# Patient Record
Sex: MALE | Race: BLACK OR AFRICAN AMERICAN | Employment: UNEMPLOYED | URBAN - NONMETROPOLITAN AREA
[De-identification: names, ages, dates, MRNs, and addresses within clinical notes are randomized per-mention and may not be internally consistent; named-entity substitution may affect disease eponyms.]

---
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
## 2023-02-26 ENCOUNTER — APPOINTMENT (OUTPATIENT)
Age: 23
End: 2023-02-26
Payer: COMMERCIAL

## 2023-02-26 ENCOUNTER — HOSPITAL ENCOUNTER (EMERGENCY)
Age: 23
Discharge: HOME | End: 2023-02-26
Attending: EMERGENCY MEDICINE
Payer: COMMERCIAL

## 2023-02-26 VITALS
HEIGHT: 73 IN | BODY MASS INDEX: 29.42 KG/M2 | RESPIRATION RATE: 17 BRPM | DIASTOLIC BLOOD PRESSURE: 85 MMHG | TEMPERATURE: 98.5 F | HEART RATE: 79 BPM | SYSTOLIC BLOOD PRESSURE: 137 MMHG | OXYGEN SATURATION: 100 % | WEIGHT: 222 LBS

## 2023-02-26 DIAGNOSIS — S70.02XA CONTUSION OF LEFT HIP, INITIAL ENCOUNTER: Primary | ICD-10-CM

## 2023-02-26 DIAGNOSIS — D17.79 LIPOMA OF OTHER SPECIFIED SITES: ICD-10-CM

## 2023-02-26 DIAGNOSIS — S70.02XA HIP HEMATOMA, LEFT, INITIAL ENCOUNTER: ICD-10-CM

## 2023-02-26 PROCEDURE — 6370000000 HC RX 637 (ALT 250 FOR IP): Performed by: EMERGENCY MEDICINE

## 2023-02-26 PROCEDURE — 99283 EMERGENCY DEPT VISIT LOW MDM: CPT

## 2023-02-26 PROCEDURE — 73502 X-RAY EXAM HIP UNI 2-3 VIEWS: CPT

## 2023-02-26 RX ORDER — IBUPROFEN 600 MG/1
600 TABLET ORAL 4 TIMES DAILY PRN
Qty: 20 TABLET | Refills: 0 | Status: CANCELLED | OUTPATIENT
Start: 2023-02-26 | End: 2023-03-03

## 2023-02-26 RX ORDER — IBUPROFEN 400 MG/1
800 TABLET ORAL ONCE
Status: COMPLETED | OUTPATIENT
Start: 2023-02-26 | End: 2023-02-26

## 2023-02-26 RX ADMIN — IBUPROFEN 800 MG: 400 TABLET, FILM COATED ORAL at 15:56

## 2023-02-26 ASSESSMENT — PAIN SCALES - GENERAL
PAINLEVEL_OUTOF10: 4
PAINLEVEL_OUTOF10: 4

## 2023-02-26 ASSESSMENT — ENCOUNTER SYMPTOMS
GASTROINTESTINAL NEGATIVE: 1
RESPIRATORY NEGATIVE: 1

## 2023-02-26 ASSESSMENT — PAIN - FUNCTIONAL ASSESSMENT
PAIN_FUNCTIONAL_ASSESSMENT: ACTIVITIES ARE NOT PREVENTED
PAIN_FUNCTIONAL_ASSESSMENT: 0-10

## 2023-02-26 ASSESSMENT — PAIN DESCRIPTION - ONSET: ONSET: ON-GOING

## 2023-02-26 ASSESSMENT — LIFESTYLE VARIABLES
HOW OFTEN DO YOU HAVE A DRINK CONTAINING ALCOHOL: NEVER
HOW MANY STANDARD DRINKS CONTAINING ALCOHOL DO YOU HAVE ON A TYPICAL DAY: PATIENT DOES NOT DRINK

## 2023-02-26 ASSESSMENT — PAIN DESCRIPTION - FREQUENCY: FREQUENCY: CONTINUOUS

## 2023-02-26 ASSESSMENT — PAIN DESCRIPTION - ORIENTATION
ORIENTATION: LEFT
ORIENTATION: LEFT

## 2023-02-26 ASSESSMENT — PAIN DESCRIPTION - LOCATION
LOCATION: HIP
LOCATION: OTHER (COMMENT)

## 2023-02-26 ASSESSMENT — PAIN DESCRIPTION - PAIN TYPE: TYPE: ACUTE PAIN

## 2023-02-26 ASSESSMENT — PAIN DESCRIPTION - DESCRIPTORS
DESCRIPTORS: ACHING
DESCRIPTORS: ACHING

## 2023-02-26 NOTE — ED TRIAGE NOTES
32 61 16- noted bump beneath skin on left side at hip level - no trauma, or prior injury per patient, complains of pain - rating at a 4. Noted a lump on assessment during palpation. 1556- ibuprofen given per order.

## 2023-02-26 NOTE — ED PROVIDER NOTES
[facility] EMERGENCY DEPT  EMERGENCY DEPARTMENT HISTORY AND PHYSICAL EXAM      Date: 2/26/2023  Patient Name: Emmy Andres  MRN: [id number]  [place]: 2000  Date of evaluation: 2/26/2023  Provider: Angela Robert MD   Note Started: 5:05 PM 2/26/23    HISTORY OF PRESENT ILLNESS     Chief Complaint   Patient presents with    Mass     Left hip area. History Provided By: Patient    HPI: Emmy Andres, 23 y.o. male     No PMHx presents with left hip painful lump. He noticed it yesterday. It is a mobile lump and tender to touch. It is acute and mild. He denies any trauma and states he does not play any sports. He has used nothing for his pain. It is aggravated by palpation. PAST MEDICAL HISTORY   Past Medical History:  Past Medical History:   Diagnosis Date    Asthma        Past Surgical History:  History reviewed. No pertinent surgical history. Family History:  History reviewed. No pertinent family history. Social History:  Social History     Tobacco Use    Smoking status: Never     Passive exposure: Never    Smokeless tobacco: Never   Vaping Use    Vaping Use: Never used   Substance Use Topics    Alcohol use: Not Currently    Drug use: Yes     Types: Marijuana (Weed)       Allergies:  No Known Allergies    PCP: None None    Current Meds:   Previous Medications    ALBUTEROL SULFATE HFA (PROVENTIL;VENTOLIN;PROAIR) 108 (90 BASE) MCG/ACT INHALER    Inhale 1-2 puffs into the lungs every 4 hours as needed       REVIEW OF SYSTEMS   Review of Systems   Constitutional: Negative. HENT: Negative. Respiratory: Negative. Cardiovascular: Negative. Gastrointestinal: Negative. Genitourinary: Negative. Musculoskeletal:         Left hip pain   Neurological: Negative. All other systems reviewed and are negative. Positives and Pertinent negatives as per HPI.     PHYSICAL EXAM     Vitals:    02/26/23 1527   BP: (!) 144/91   Pulse: 80   Resp: 19   Temp: 98.5 °F (36.9 °C)   TempSrc: Oral   SpO2: 100%   Weight: 222 lb (100.7 kg)   Height: 6' 1\" (1.854 m)      Physical Exam  Vitals and nursing note reviewed. Constitutional:       General: He is not in acute distress. Appearance: Normal appearance. HENT:      Head: Normocephalic and atraumatic. Cardiovascular:      Rate and Rhythm: Normal rate and regular rhythm. Pulses: Normal pulses. Heart sounds: Normal heart sounds. Pulmonary:      Breath sounds: Normal breath sounds. Abdominal:      Palpations: Abdomen is soft. Tenderness: There is no abdominal tenderness. Musculoskeletal:      Cervical back: Normal range of motion and neck supple. Comments: 3 cm subcutaneous left lateral hip subcutaneous, non fluctuant and mobile tender swelling. Not hurt to touch. NV intact   Skin:     General: Skin is warm and dry. Findings: No erythema. Neurological:      General: No focal deficit present. Mental Status: He is alert and oriented to person, place, and time. SCREENINGS               No data recorded      LAB, EKG AND DIAGNOSTIC RESULTS   Labs:  No results found for this or any previous visit (from the past 12 hour(s)). Radiologic Studies:  Non-plain film images such as CT, Ultrasound and MRI are read by the radiologist. Plain radiographic images are visualized and preliminarily interpreted by the ED Provider with the below findings:    *    Interpretation per the Radiologist below, if available at the time of this note:  No results found. PROCEDURES   Unless otherwise noted below, none.   Performed by: Isidro Kee MD   Procedures        ED COURSE and DIFFERENTIAL DIAGNOSIS/MDM   Vitals:    Vitals:    02/26/23 1527   BP: (!) 144/91   Pulse: 80   Resp: 19   Temp: 98.5 °F (36.9 °C)   TempSrc: Oral   SpO2: 100%   Weight: 222 lb (100.7 kg)   Height: 6' 1\" (1.854 m)         Patient was given the following medications:  Medications   ibuprofen (ADVIL;MOTRIN) tablet 800 mg (800 mg Oral Given 2/26/23 7375)       CONSULTS: (Who and What was discussed)  None    Chronic Conditions: None  Social Determinants affecting Dx or Tx: Not applicable  Counseling: Hypertension    Records Reviewed (source and summary of external notes): Nursing Notes    MDM:Patient with no PMHx and no PMHx presents with tender lump on his left hip for 2 days. He denies trauma. DDx include contusion, hematoma, lipoma, bursitis. PE shows a tender non fluctuant soft tissue mobile swelling. Patient given Ibuprofen 800 mg PO and an Xray of the hip was done which was negative. He probably has a contusion with subcutaneous hematoma or less likely a lipoma as he states it popped up suddenly. I advised him to have it rechecked by his PCP in about 2 weeks to see if it is gone. He will be discharged on oral Ibuprofen and agrees with the plan             FINAL IMPRESSION     1. Contusion of left hip, initial encounter    2. Hip hematoma, left, initial encounter    3. Lipoma of other specified sites          DISPOSITION/PLAN   DISPOSITION Decision To Discharge 02/26/2023 05:05:23 PM      Discharge home friend    Discharge Note: The patient is stable for discharge home. the signs, symptom, diagnosis and discharge instructions have been discussed, understanding conveyed and agreed upon. The patient is to follow up as recommended or return to ED should their symptoms worsen. DISCHARGE MEDICATIONS:     Medication List        ASK your doctor about these medications      albuterol sulfate  (90 Base) MCG/ACT inhaler  Commonly known as: PROVENTIL;VENTOLIN;PROAIR                DISCONTINUED MEDICATIONS:  Current Discharge Medication List          I am the Primary Clinician of Record: Cricket Santos MD (electronically signed)    (Please note that parts of this dictation were completed with voice recognition software.  Quite often unanticipated grammatical, syntax, homophones, and other interpretive errors are inadvertently transcribed by the computer software. Please disregards these errors.  Please excuse any errors that have escaped final proofreading.)     Tanya Gaviria MD  02/26/23 1009

## 2024-11-25 ENCOUNTER — OFFICE VISIT (OUTPATIENT)
Facility: CLINIC | Age: 24
End: 2024-11-25

## 2024-11-25 VITALS
BODY MASS INDEX: 34.72 KG/M2 | HEART RATE: 98 BPM | OXYGEN SATURATION: 97 % | HEIGHT: 73 IN | TEMPERATURE: 97.6 F | SYSTOLIC BLOOD PRESSURE: 115 MMHG | RESPIRATION RATE: 18 BRPM | WEIGHT: 262 LBS | DIASTOLIC BLOOD PRESSURE: 75 MMHG

## 2024-11-25 DIAGNOSIS — E66.09 CLASS 1 OBESITY DUE TO EXCESS CALORIES WITHOUT SERIOUS COMORBIDITY WITH BODY MASS INDEX (BMI) OF 34.0 TO 34.9 IN ADULT: Primary | ICD-10-CM

## 2024-11-25 DIAGNOSIS — E66.811 CLASS 1 OBESITY DUE TO EXCESS CALORIES WITHOUT SERIOUS COMORBIDITY WITH BODY MASS INDEX (BMI) OF 34.0 TO 34.9 IN ADULT: Primary | ICD-10-CM

## 2024-11-25 DIAGNOSIS — Z87.09 HISTORY OF ASTHMA: ICD-10-CM

## 2024-11-25 DIAGNOSIS — Z13.21 ENCOUNTER FOR VITAMIN DEFICIENCY SCREENING: ICD-10-CM

## 2024-11-25 DIAGNOSIS — Z13.29 THYROID DISORDER SCREENING: ICD-10-CM

## 2024-11-25 DIAGNOSIS — R05.2 SUBACUTE COUGH: ICD-10-CM

## 2024-11-25 DIAGNOSIS — R06.83 SNORING: ICD-10-CM

## 2024-11-25 DIAGNOSIS — Z13.1 SCREENING FOR DIABETES MELLITUS: ICD-10-CM

## 2024-11-25 DIAGNOSIS — Z91.89 AT RISK FOR SLEEP APNEA: ICD-10-CM

## 2024-11-25 DIAGNOSIS — Z13.220 SCREENING FOR CHOLESTEROL LEVEL: ICD-10-CM

## 2024-11-25 RX ORDER — ALBUTEROL SULFATE 90 UG/1
1-2 INHALANT RESPIRATORY (INHALATION) EVERY 4 HOURS PRN
Qty: 18 G | Refills: 0 | Status: SHIPPED | OUTPATIENT
Start: 2024-11-25 | End: 2024-12-25

## 2024-11-25 SDOH — ECONOMIC STABILITY: FOOD INSECURITY: WITHIN THE PAST 12 MONTHS, YOU WORRIED THAT YOUR FOOD WOULD RUN OUT BEFORE YOU GOT MONEY TO BUY MORE.: NEVER TRUE

## 2024-11-25 SDOH — ECONOMIC STABILITY: FOOD INSECURITY: WITHIN THE PAST 12 MONTHS, THE FOOD YOU BOUGHT JUST DIDN'T LAST AND YOU DIDN'T HAVE MONEY TO GET MORE.: NEVER TRUE

## 2024-11-25 SDOH — ECONOMIC STABILITY: INCOME INSECURITY: HOW HARD IS IT FOR YOU TO PAY FOR THE VERY BASICS LIKE FOOD, HOUSING, MEDICAL CARE, AND HEATING?: NOT VERY HARD

## 2024-11-25 ASSESSMENT — PATIENT HEALTH QUESTIONNAIRE - PHQ9
SUM OF ALL RESPONSES TO PHQ QUESTIONS 1-9: 0
1. LITTLE INTEREST OR PLEASURE IN DOING THINGS: NOT AT ALL
SUM OF ALL RESPONSES TO PHQ9 QUESTIONS 1 & 2: 0
SUM OF ALL RESPONSES TO PHQ QUESTIONS 1-9: 0
2. FEELING DOWN, DEPRESSED OR HOPELESS: NOT AT ALL

## 2024-11-25 NOTE — PROGRESS NOTES
Chief Complaint   Patient presents with    New Patient     Establish care, states when he coughs he becomes lightheaded       \"Have you been to the ER, urgent care clinic since your last visit?  Hospitalized since your last visit?\"    NO    “Have you seen or consulted any other health care providers outside our system since your last visit?”    NO            
nodules or masses.  There is no lymphadenopathy.  CV:  The heart sounds are regular in rate and rhythm.  There is a normal S1 and S2.  There or no murmurs, rubs, or gallops.  Distal pulses are intact and equal.  Lungs:  Inspiratory and expiratory efforts are full and unlabored.  Lung sounds are clear and equal to auscultation throughout all lung fields without wheezing, rales, or rhonchi.  Extremities:  There is no clubbing, cyanosis, or edema.  3+ peripheral pulses.cap refill less than 2 seconds   Neurological:    There is no obvious focal sensory or motor deficits.   Strength is 5/5 in the upper and lower extremity bilaterally.            Odalys Vazquez, FNP, APRN

## 2025-01-06 ENCOUNTER — TELEPHONE (OUTPATIENT)
Dept: SLEEP MEDICINE | Facility: HOSPITAL | Age: 25
End: 2025-01-06

## 2025-01-06 ENCOUNTER — OFFICE VISIT (OUTPATIENT)
Age: 25
End: 2025-01-06
Payer: COMMERCIAL

## 2025-01-06 VITALS
HEART RATE: 88 BPM | WEIGHT: 262 LBS | HEIGHT: 73 IN | DIASTOLIC BLOOD PRESSURE: 79 MMHG | SYSTOLIC BLOOD PRESSURE: 149 MMHG | RESPIRATION RATE: 18 BRPM | TEMPERATURE: 98.4 F | BODY MASS INDEX: 34.72 KG/M2 | OXYGEN SATURATION: 96 %

## 2025-01-06 DIAGNOSIS — F17.200 SMOKER: ICD-10-CM

## 2025-01-06 DIAGNOSIS — G47.33 OSA (OBSTRUCTIVE SLEEP APNEA): Primary | ICD-10-CM

## 2025-01-06 PROCEDURE — 99204 OFFICE O/P NEW MOD 45 MIN: CPT | Performed by: INTERNAL MEDICINE

## 2025-01-06 NOTE — PATIENT INSTRUCTIONS
Patient Education        Learning About Sleep Apnea  What is it?     Sleep apnea means that breathing stops for short periods during sleep. When you stop breathing or have reduced airflow into your lungs during sleep, you don't sleep well and you can be very tired during the day. The oxygen levels in your blood may go down, and carbon dioxide levels go up. It may lead to other problems, such as high blood pressure and heart disease.  Sleep apnea can range from mild to severe, based on how often breathing stops during sleep. For adults, breathing may stop as few as 5 times an hour (mild apnea) to 30 or more times an hour (severe apnea).  Obstructive sleep apnea is the most common type. This most often occurs because your airways are blocked or partly blocked. Central sleep apnea is less common. It happens when the brain has trouble controlling breathing. Some people have both types. That's called complex sleep apnea.  What are the symptoms?  There are symptoms of sleep apnea that you may notice and symptoms that others may notice when you're asleep.  Symptoms you may notice include:  Feeling extremely sleepy during the day.  Feeling unrefreshed or tired after a night's sleep.  Problems with memory and concentration, or mood changes.  Morning headaches.  Getting up often during the night to urinate.  A dry mouth or sore throat in the morning.  If you have a bed partner, they may notice that you:  Have episodes of not breathing.  Snore loudly. Almost all people who have sleep apnea snore. But not all people who snore have sleep apnea.  Toss and turn during sleep.  Have nighttime choking or gasping spells.  How is it diagnosed?  Your doctor will probably do a physical exam and ask about your past health. The doctor may also ask you or your bed partner about your snoring and sleep behavior and how tired you feel during the day.  Your doctor may suggest a sleep study. Sleep studies are a series of tests that look at what

## 2025-01-06 NOTE — PROGRESS NOTES
Alfhipolito Lomax presents today for   Chief Complaint   Patient presents with    New Patient     Snoring/ Kicking in sleep also talking in sleep       Is someone accompanying this pt? No    Is the patient using any DME equipment during OV? No    -DME Company No    Depression Screenin/25/2024     2:10 PM   PHQ-9 Questionaire   Little interest or pleasure in doing things 0   Feeling down, depressed, or hopeless 0   PHQ-9 Total Score 0       Learning Assessment:    Failed to redirect to the Timeline version of the [employer].    Abuse Screening:         No data to display                Fall Risk    Failed to redirect to the Timeline version of the [employer].    Coordination of Care:    1. Have you been to the ER, urgent care clinic since your last visit? Hospitalized since your last visit? No    2. Have you seen or consulted any other health care providers outside of the [facility] since your last visit? Include any pap smears or colon screening. No    Medication list has been update per patient.

## 2025-01-06 NOTE — PROGRESS NOTES
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  Forklift/working requirement precaution has been discussed with patient at length  We will evaluate and treat SUKHDEEP  History of asthma:   As per the note seems like patient has poorly controlled asthma and requires inhalers however patient is telling me that he is using only albuterol  Further management per PCP  If required consider pulmonary consultation  Active smoker:   Smoking cessation counseling done  Impact of smoking and sleep discussed   Plan:  Home sleep study  Precaution of operating heavy missionary discussed with patient at length  Sleep hygiene measures were discussed with patient at length.   and workplace safety reviewed.  Drowsy and inattentive driving should be avoided.  Follow Up Visit  2025     Orders: Labs/sleep study  Orders Placed This Encounter   Procedures    PAT - Home Sleep Test     Standing Status:   Future     Standing Expiration Date:   2025     Scheduling Instructions:      Read By Bernadette Clark     Order Specific Question:   Location For Sleep Study     Answer:   [facility]      Meds ordered on this visit  No orders of the defined types were placed in this encounter.        Old records reviewed discussed results and management plan with patient  Plan of care discussed with patient.    SUBJECTIVE: 24-year-old gentleman was referred for evaluation of sleep apnea,

## 2025-01-07 ENCOUNTER — TELEPHONE (OUTPATIENT)
Dept: SLEEP MEDICINE | Facility: HOSPITAL | Age: 25
End: 2025-01-07

## 2025-02-10 ENCOUNTER — HOSPITAL ENCOUNTER (EMERGENCY)
Age: 25
Discharge: LEFT BEFORE DISCHARGE | End: 2025-02-10